# Patient Record
Sex: MALE | Race: OTHER | HISPANIC OR LATINO | Employment: UNEMPLOYED | URBAN - METROPOLITAN AREA
[De-identification: names, ages, dates, MRNs, and addresses within clinical notes are randomized per-mention and may not be internally consistent; named-entity substitution may affect disease eponyms.]

---

## 2020-02-09 ENCOUNTER — APPOINTMENT (EMERGENCY)
Dept: RADIOLOGY | Facility: HOSPITAL | Age: 35
End: 2020-02-09
Payer: COMMERCIAL

## 2020-02-09 ENCOUNTER — HOSPITAL ENCOUNTER (EMERGENCY)
Facility: HOSPITAL | Age: 35
End: 2020-02-09
Attending: EMERGENCY MEDICINE | Admitting: EMERGENCY MEDICINE
Payer: COMMERCIAL

## 2020-02-09 ENCOUNTER — HOSPITAL ENCOUNTER (EMERGENCY)
Facility: HOSPITAL | Age: 35
Discharge: HOME | End: 2020-02-09
Admitting: EMERGENCY MEDICINE
Payer: COMMERCIAL

## 2020-02-09 ENCOUNTER — APPOINTMENT (EMERGENCY)
Dept: CT IMAGING | Facility: HOSPITAL | Age: 35
End: 2020-02-09
Payer: COMMERCIAL

## 2020-02-09 VITALS
WEIGHT: 260 LBS | OXYGEN SATURATION: 92 % | DIASTOLIC BLOOD PRESSURE: 76 MMHG | SYSTOLIC BLOOD PRESSURE: 132 MMHG | HEART RATE: 88 BPM | RESPIRATION RATE: 18 BRPM

## 2020-02-09 VITALS
RESPIRATION RATE: 18 BRPM | OXYGEN SATURATION: 96 % | DIASTOLIC BLOOD PRESSURE: 79 MMHG | HEART RATE: 115 BPM | SYSTOLIC BLOOD PRESSURE: 159 MMHG | TEMPERATURE: 100.6 F

## 2020-02-09 DIAGNOSIS — S21.211A: ICD-10-CM

## 2020-02-09 DIAGNOSIS — S31.119A STAB WOUND OF ABDOMEN WITHOUT COMPLICATION, INITIAL ENCOUNTER: ICD-10-CM

## 2020-02-09 DIAGNOSIS — S21.219A: Primary | ICD-10-CM

## 2020-02-09 DIAGNOSIS — T14.8XXA STAB WOUND: Primary | ICD-10-CM

## 2020-02-09 LAB
ALBUMIN SERPL BCP-MCNC: 4.4 G/DL (ref 3.5–5)
ALP SERPL-CCNC: 64 U/L (ref 46–116)
ALT SERPL W P-5'-P-CCNC: 56 U/L (ref 12–78)
ANION GAP SERPL CALCULATED.3IONS-SCNC: 15 MMOL/L (ref 4–13)
APTT PPP: 26 SECONDS (ref 23–37)
AST SERPL W P-5'-P-CCNC: 42 U/L (ref 5–45)
BASOPHILS # BLD AUTO: 0.06 THOUSANDS/ΜL (ref 0–0.1)
BASOPHILS NFR BLD AUTO: 1 % (ref 0–1)
BILIRUB SERPL-MCNC: 0.77 MG/DL (ref 0.2–1)
BUN SERPL-MCNC: 11 MG/DL (ref 5–25)
CALCIUM SERPL-MCNC: 9 MG/DL (ref 8.3–10.1)
CHLORIDE SERPL-SCNC: 102 MMOL/L (ref 100–108)
CO2 SERPL-SCNC: 23 MMOL/L (ref 21–32)
CREAT SERPL-MCNC: 1.25 MG/DL (ref 0.6–1.3)
EOSINOPHIL # BLD AUTO: 0.02 THOUSAND/ΜL (ref 0–0.61)
EOSINOPHIL NFR BLD AUTO: 0 % (ref 0–6)
ERYTHROCYTE [DISTWIDTH] IN BLOOD BY AUTOMATED COUNT: 12.2 % (ref 11.6–15.1)
GFR SERPL CREATININE-BSD FRML MDRD: 74 ML/MIN/1.73SQ M
GLUCOSE SERPL-MCNC: 150 MG/DL (ref 65–140)
HCT VFR BLD AUTO: 48 % (ref 36.5–49.3)
HGB BLD-MCNC: 16 G/DL (ref 12–17)
IMM GRANULOCYTES # BLD AUTO: 0.03 THOUSAND/UL (ref 0–0.2)
IMM GRANULOCYTES NFR BLD AUTO: 0 % (ref 0–2)
INR PPP: 1.06 (ref 0.84–1.19)
LIPASE SERPL-CCNC: 94 U/L (ref 73–393)
LYMPHOCYTES # BLD AUTO: 3.37 THOUSANDS/ΜL (ref 0.6–4.47)
LYMPHOCYTES NFR BLD AUTO: 35 % (ref 14–44)
MCH RBC QN AUTO: 33.9 PG (ref 26.8–34.3)
MCHC RBC AUTO-ENTMCNC: 33.3 G/DL (ref 31.4–37.4)
MCV RBC AUTO: 102 FL (ref 82–98)
MONOCYTES # BLD AUTO: 0.72 THOUSAND/ΜL (ref 0.17–1.22)
MONOCYTES NFR BLD AUTO: 7 % (ref 4–12)
NEUTROPHILS # BLD AUTO: 5.55 THOUSANDS/ΜL (ref 1.85–7.62)
NEUTS SEG NFR BLD AUTO: 57 % (ref 43–75)
NRBC BLD AUTO-RTO: 0 /100 WBCS
PLATELET # BLD AUTO: 284 THOUSANDS/UL (ref 149–390)
PMV BLD AUTO: 9.7 FL (ref 8.9–12.7)
POTASSIUM SERPL-SCNC: 3.8 MMOL/L (ref 3.5–5.3)
PROT SERPL-MCNC: 8.9 G/DL (ref 6.4–8.2)
PROTHROMBIN TIME: 13.9 SECONDS (ref 11.6–14.5)
RBC # BLD AUTO: 4.72 MILLION/UL (ref 3.88–5.62)
SODIUM SERPL-SCNC: 140 MMOL/L (ref 136–145)
WBC # BLD AUTO: 9.75 THOUSAND/UL (ref 4.31–10.16)

## 2020-02-09 PROCEDURE — 71260 CT THORAX DX C+: CPT

## 2020-02-09 PROCEDURE — 80053 COMPREHEN METABOLIC PANEL: CPT | Performed by: EMERGENCY MEDICINE

## 2020-02-09 PROCEDURE — 74177 CT ABD & PELVIS W/CONTRAST: CPT

## 2020-02-09 PROCEDURE — 90471 IMMUNIZATION ADMIN: CPT

## 2020-02-09 PROCEDURE — 85610 PROTHROMBIN TIME: CPT | Performed by: EMERGENCY MEDICINE

## 2020-02-09 PROCEDURE — 71045 X-RAY EXAM CHEST 1 VIEW: CPT

## 2020-02-09 PROCEDURE — 36415 COLL VENOUS BLD VENIPUNCTURE: CPT | Performed by: EMERGENCY MEDICINE

## 2020-02-09 PROCEDURE — 96360 HYDRATION IV INFUSION INIT: CPT

## 2020-02-09 PROCEDURE — 83690 ASSAY OF LIPASE: CPT | Performed by: EMERGENCY MEDICINE

## 2020-02-09 PROCEDURE — 85025 COMPLETE CBC W/AUTO DIFF WBC: CPT | Performed by: EMERGENCY MEDICINE

## 2020-02-09 PROCEDURE — 99285 EMERGENCY DEPT VISIT HI MDM: CPT

## 2020-02-09 PROCEDURE — 90715 TDAP VACCINE 7 YRS/> IM: CPT | Performed by: EMERGENCY MEDICINE

## 2020-02-09 PROCEDURE — 85730 THROMBOPLASTIN TIME PARTIAL: CPT | Performed by: EMERGENCY MEDICINE

## 2020-02-09 PROCEDURE — 99284 EMERGENCY DEPT VISIT MOD MDM: CPT | Performed by: SURGERY

## 2020-02-09 PROCEDURE — 76705 ECHO EXAM OF ABDOMEN: CPT | Performed by: EMERGENCY MEDICINE

## 2020-02-09 PROCEDURE — 99284 EMERGENCY DEPT VISIT MOD MDM: CPT | Performed by: EMERGENCY MEDICINE

## 2020-02-09 PROCEDURE — 12002 RPR S/N/AX/GEN/TRNK2.6-7.5CM: CPT | Performed by: SURGERY

## 2020-02-09 RX ORDER — LIDOCAINE HYDROCHLORIDE AND EPINEPHRINE 10; 10 MG/ML; UG/ML
10 INJECTION, SOLUTION INFILTRATION; PERINEURAL ONCE
Status: COMPLETED | OUTPATIENT
Start: 2020-02-09 | End: 2020-02-09

## 2020-02-09 RX ADMIN — SODIUM CHLORIDE 1000 ML: 0.9 INJECTION, SOLUTION INTRAVENOUS at 05:39

## 2020-02-09 RX ADMIN — SODIUM CHLORIDE 1000 ML: 0.9 INJECTION, SOLUTION INTRAVENOUS at 05:19

## 2020-02-09 RX ADMIN — LIDOCAINE HYDROCHLORIDE,EPINEPHRINE BITARTRATE 10 ML: 10; .01 INJECTION, SOLUTION INFILTRATION; PERINEURAL at 07:09

## 2020-02-09 RX ADMIN — TETANUS TOXOID, REDUCED DIPHTHERIA TOXOID AND ACELLULAR PERTUSSIS VACCINE, ADSORBED 0.5 ML: 5; 2.5; 8; 8; 2.5 SUSPENSION INTRAMUSCULAR at 05:26

## 2020-02-09 RX ADMIN — IOHEXOL 100 ML: 350 INJECTION, SOLUTION INTRAVENOUS at 06:07
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
## 2020-02-09 NOTE — ED NOTES
Call placed to [address] [place] [address]   Security notified for ER to be placed on lock down      [facility]  02/09/20 2351

## 2020-02-09 NOTE — DISCHARGE INSTRUCTIONS
Please call Trauma office and make a follow-up appointment within 1 week for suture removal and recheck of wounds  Return to the ER with fever, shaking chills, spreading redness around the wound, pus coming from the wound, or if your bandages are saturated with blood  Return if you have sustained lightheadedness, dizziness, chest pain, or shortness of breath
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
## 2021-04-24 ENCOUNTER — HOSPITAL ENCOUNTER (EMERGENCY)
Facility: HOSPITAL | Age: 36
Discharge: HOME | End: 2021-04-24
Attending: EMERGENCY MEDICINE | Admitting: EMERGENCY MEDICINE
Payer: COMMERCIAL

## 2021-04-24 VITALS
OXYGEN SATURATION: 100 % | TEMPERATURE: 98.9 F | WEIGHT: 308.86 LBS | RESPIRATION RATE: 16 BRPM | SYSTOLIC BLOOD PRESSURE: 166 MMHG | DIASTOLIC BLOOD PRESSURE: 99 MMHG | HEART RATE: 88 BPM

## 2021-04-24 DIAGNOSIS — R04.0 RIGHT-SIDED EPISTAXIS: Primary | ICD-10-CM

## 2021-04-24 PROCEDURE — 30901 CONTROL OF NOSEBLEED: CPT | Performed by: EMERGENCY MEDICINE

## 2021-04-24 PROCEDURE — 99282 EMERGENCY DEPT VISIT SF MDM: CPT

## 2021-04-24 PROCEDURE — 99282 EMERGENCY DEPT VISIT SF MDM: CPT | Performed by: EMERGENCY MEDICINE

## 2021-04-24 NOTE — ED PROVIDER NOTES
History  Chief Complaint   Patient presents with    Nose Bleed     bleeding controlled: states for the past 3 days "on and off for 20 mins or so"     Patient is a 68-year-old male who presents with a 3 day history of intermittent bleeding from the right nostril  Denies any trauma  States onset was when he coughed  Denies any similar symptoms in the past   No fevers sweats chills no shortness of breath no dizziness  Has held pressure immediately with some relief  No active bleeding currently          None       History reviewed  No pertinent past medical history  History reviewed  No pertinent surgical history  History reviewed  No pertinent family history  I have reviewed and agree with the history as documented  E-Cigarette/Vaping     E-Cigarette/Vaping Substances     Social History     Tobacco Use    Smoking status: Never Smoker    Smokeless tobacco: Current User   Substance Use Topics    Alcohol use: Yes     Comment: social    Drug use: Not Currently       Review of Systems   Constitutional: Negative  HENT: Positive for nosebleeds  Eyes: Negative  Respiratory: Negative  Cardiovascular: Negative  Gastrointestinal: Negative  Endocrine: Negative  Genitourinary: Negative  Musculoskeletal: Negative  Skin: Negative  Allergic/Immunologic: Negative  Neurological: Negative  Hematological: Negative  Psychiatric/Behavioral: Negative  All other systems reviewed and are negative  Physical Exam  Physical Exam  Vitals signs and nursing note reviewed  Constitutional:       Appearance: Normal appearance  He is normal weight  HENT:      Head: Normocephalic and atraumatic  Right Ear: Tympanic membrane, ear canal and external ear normal       Left Ear: Tympanic membrane, ear canal and external ear normal       Nose:      Comments: Positive fresh clots on the lateral aspect of the right nare       Mouth/Throat:      Mouth: Mucous membranes are moist  Pharynx: Oropharynx is clear  Eyes:      Conjunctiva/sclera: Conjunctivae normal       Pupils: Pupils are equal, round, and reactive to light  Neck:      Musculoskeletal: Normal range of motion and neck supple  Cardiovascular:      Rate and Rhythm: Normal rate and regular rhythm  Pulses: Normal pulses  Heart sounds: Normal heart sounds  Pulmonary:      Effort: Pulmonary effort is normal       Breath sounds: Normal breath sounds  Musculoskeletal: Normal range of motion  Skin:     General: Skin is warm and dry  Capillary Refill: Capillary refill takes less than 2 seconds  Neurological:      General: No focal deficit present  Mental Status: He is alert and oriented to person, place, and time  Psychiatric:         Mood and Affect: Mood normal          Behavior: Behavior normal          Vital Signs  ED Triage Vitals [04/24/21 1602]   Temperature Pulse Respirations Blood Pressure SpO2   98 9 °F (37 2 °C) 88 16 166/99 100 %      Temp Source Heart Rate Source Patient Position - Orthostatic VS BP Location FiO2 (%)   Tympanic Monitor Sitting Left arm --      Pain Score       No Pain           Vitals:    04/24/21 1602   BP: 166/99   Pulse: 88   Patient Position - Orthostatic VS: Sitting         Visual Acuity      ED Medications  Medications - No data to display    Diagnostic Studies  Results Reviewed     None                 No orders to display              Procedures  Procedures         ED Course                                           MDM    Disposition  Final diagnoses:   Right-sided epistaxis     Time reflects when diagnosis was documented in both MDM as applicable and the Disposition within this note     Time User Action Codes Description Comment    4/24/2021  4:14 PM Mary Maharaj Add [R04 0] Right-sided epistaxis       ED Disposition     ED Disposition Condition Date/Time Comment    Discharge Stable Sat Apr 24, 2021  4:14 PM Tania Flies discharge to home/self care  Follow-up Information     Follow up With Specialties Details Why Contact Info Additional [address] [facility] Emergency Medicine In 2 days For removal of packing  [address] [id number] [address] [facility]          There are no discharge medications for this patient  No discharge procedures on file      PDMP Review     None          ED Provider  Electronically Signed by           Flakito Pablo MD  04/24/21 4906

## 2021-04-24 NOTE — ED PROCEDURE NOTE
PROCEDURE  Epistaxis management    Date/Time: 4/24/2021 4:15 PM  Performed by: Tramaine Escalona MD  Authorized by: Tramaine Escalona MD   Universal Protocol:  Risks and benefits: risks, benefits and alternatives were discussed  Consent given by: patient  Time out: Immediately prior to procedure a "time out" was called to verify the correct patient, procedure, equipment, support staff and site/side marked as required  Timeout called at: 4/24/2021 4:10 PM   Patient understanding: patient states understanding of the procedure being performed      Patient location:  ED  Anesthesia (see MAR for exact dosages): Anesthesia method:  None  Procedure details:     Treatment site:  R anterior    Hemostasis method:  Anterior pack    Approach:  External    Spec Headlamp used: No      Treatment complexity:  Limited    Treatment episode: initial    Post-procedure details:     Assessment:  Bleeding stopped    Patient tolerance of procedure:   Tolerated well, no immediate complications         Tramaine Escalona MD  04/24/21 7112

## 2023-01-03 ENCOUNTER — HOSPITAL ENCOUNTER (EMERGENCY)
Facility: HOSPITAL | Age: 38
Discharge: HOME | End: 2023-01-03
Attending: EMERGENCY MEDICINE

## 2023-01-03 VITALS
HEART RATE: 75 BPM | HEIGHT: 71 IN | OXYGEN SATURATION: 98 % | BODY MASS INDEX: 40.36 KG/M2 | DIASTOLIC BLOOD PRESSURE: 105 MMHG | RESPIRATION RATE: 16 BRPM | TEMPERATURE: 98.3 F | WEIGHT: 288.3 LBS | SYSTOLIC BLOOD PRESSURE: 170 MMHG

## 2023-01-03 DIAGNOSIS — L98.0 PYOGENIC GRANULOMA OF SKIN: Primary | ICD-10-CM

## 2023-01-03 RX ORDER — IMIQUIMOD 12.5 MG/.25G
1 CREAM TOPICAL 3 TIMES WEEKLY
Qty: 6 PACKET | Refills: 0 | Status: SHIPPED | OUTPATIENT
Start: 2023-01-04 | End: 2023-01-18

## 2023-01-03 NOTE — ED PROVIDER NOTES
History  Chief Complaint   Patient presents with   • Finger Injury     Pain for 2 weeks in right index finger tip; has been bleeding for past 2 days  HPI  Patient is a 57-year-old male no significant past medical history presenting today with a right index finger lesion  Patient reports that the lesion has been growing in size over the last 2 weeks  It is nonpainful  He denies any inciting factor including trauma abrasions injuries crush injuries  Denies any beginning vesicular lesions  Denies any systemic symptoms including fever chills nausea vomiting diarrhea constipation denies any oral lesions  Has otherwise been in his usual state of health  Reports that yesterday began to bleed when he was pushing on it  Currently hemostatic  No prior history of similar  None       Past Medical History:   Diagnosis Date   • Hypertension        History reviewed  No pertinent surgical history  History reviewed  No pertinent family history  I have reviewed and agree with the history as documented  E-Cigarette/Vaping   • E-Cigarette Use Never User      E-Cigarette/Vaping Substances     Social History     Tobacco Use   • Smoking status: Never   • Smokeless tobacco: Current   Vaping Use   • Vaping Use: Never used   Substance Use Topics   • Alcohol use: Yes     Comment: social   • Drug use: Not Currently       Review of Systems   Constitutional: Negative for chills and fever  HENT: Negative for congestion, rhinorrhea and sore throat  Eyes: Negative for redness and visual disturbance  Respiratory: Negative for cough and shortness of breath  Cardiovascular: Negative for chest pain and palpitations  Gastrointestinal: Negative for constipation, diarrhea, nausea and vomiting  Genitourinary: Negative for dysuria and hematuria  Musculoskeletal: Negative for myalgias and neck pain  Skin: Positive for wound  Negative for rash  Allergic/Immunologic: Negative for immunocompromised state     Neurological: Negative for seizures and syncope  Psychiatric/Behavioral: Negative for confusion and suicidal ideas  Physical Exam  Physical Exam  Vitals and nursing note reviewed  Constitutional:       General: He is not in acute distress  Appearance: Normal appearance  He is well-developed  HENT:      Head: Normocephalic and atraumatic  No raccoon eyes  Right Ear: External ear normal       Left Ear: External ear normal       Nose: Nose normal  No congestion  Mouth/Throat:      Lips: Pink  Mouth: Mucous membranes are moist    Eyes:      General: Lids are normal  No scleral icterus  Extraocular Movements: Extraocular movements intact  Cardiovascular:      Rate and Rhythm: Normal rate and regular rhythm  Heart sounds: No murmur heard  No friction rub  Pulmonary:      Effort: Pulmonary effort is normal  No respiratory distress  Breath sounds: No wheezing or rhonchi  Abdominal:      General: Abdomen is flat  Palpations: Abdomen is soft  Tenderness: There is no abdominal tenderness  There is no guarding or rebound  Musculoskeletal:      Cervical back: Normal range of motion  No torticollis  Skin:     General: Skin is warm and dry  Coloration: Skin is not jaundiced  Findings: No rash  Comments: Ulcerative appearing lesion with area of red granulomatous tissue in center that is slightly raised  Please see picture below   Neurological:      Mental Status: He is alert and oriented to person, place, and time  Mental status is at baseline  Psychiatric:         Behavior: Behavior normal  Behavior is cooperative             Vital Signs  ED Triage Vitals [01/03/23 1120]   Temperature Pulse Respirations Blood Pressure SpO2   98 3 °F (36 8 °C) 75 16 (!) 170/105 98 %      Temp Source Heart Rate Source Patient Position - Orthostatic VS BP Location FiO2 (%)   Oral Monitor Sitting Left arm --      Pain Score       No Pain           Vitals:    01/03/23 1120   BP: (!) 170/105   Pulse: 75   Patient Position - Orthostatic VS: Sitting         Visual Acuity      ED Medications  Medications - No data to display    Diagnostic Studies  Results Reviewed     None                 No orders to display              Procedures  Procedures         ED Course    Patient on arrival is ambulatory to room is in no acute distress, vital signs stable, afebrile  On exam lungs clear auscultation, heart without murmurs rubs or gallops abdomen soft nontender  Lesion as described and shown above  Area seems consistent with pyogenic granuloma however no known preceding trauma  There is some evidence of imiquimod cream in treatment will prescribe and refer to dermatology for reevaluation and further management  Return precautions discussed  Will DC in stable condition  SBIRT 22yo+    Flowsheet Row Most Recent Value   SBIRT (25 yo +)    In order to provide better care to our patients, we are screening all of our patients for alcohol and drug use  Would it be okay to ask you these screening questions? No Filed at: 01/03/2023 1128                    Medical Decision Making  Pyogenic granuloma of skin: acute illness or injury  Amount and/or Complexity of Data Reviewed  Independent Historian: spouse      Risk  Decision regarding hospitalization  Disposition  Final diagnoses:   Pyogenic granuloma of skin     Time reflects when diagnosis was documented in both MDM as applicable and the Disposition within this note     Time User Action Codes Description Comment    1/3/2023 11:36 AM Anatoly Munroe Add [L98 0] Pyogenic granuloma of skin       ED Disposition     ED Disposition   Discharge    Condition   Stable    Date/Time   Tue Sameer 3, 2023 [address] discharge to home/self care                 Follow-up Information    None         Patient's Medications   Discharge Prescriptions    IMIQUIMOD (ALDARA) 5 % CREAM    Apply 1 packet topically 3 (three) times a week for 14 days Place on finger and keep covered with bandage for 2 weeks       Start Date: 1/4/2023  End Date: 1/18/2023       Order Dose: 1 packet       Quantity: 6 packet    Refills: 0           PDMP Review     None          ED Provider  Electronically Signed by           Aggie Osuna MD  01/03/23 1143

## 2023-01-03 NOTE — DISCHARGE INSTRUCTIONS
We recommend using the cream as prescribed 3 times per week  Please schedule an appointment for dermatology for reevaluation and treatment  Please return for any new or worsening symptoms